# Patient Record
Sex: MALE | Race: WHITE | NOT HISPANIC OR LATINO | Employment: FULL TIME | URBAN - METROPOLITAN AREA
[De-identification: names, ages, dates, MRNs, and addresses within clinical notes are randomized per-mention and may not be internally consistent; named-entity substitution may affect disease eponyms.]

---

## 2022-01-12 ENCOUNTER — APPOINTMENT (OUTPATIENT)
Dept: RADIOLOGY | Age: 27
End: 2022-01-12
Payer: COMMERCIAL

## 2022-01-12 ENCOUNTER — APPOINTMENT (OUTPATIENT)
Dept: LAB | Age: 27
End: 2022-01-12
Payer: COMMERCIAL

## 2022-01-12 DIAGNOSIS — I10 ESSENTIAL HYPERTENSION, MALIGNANT: ICD-10-CM

## 2022-01-12 DIAGNOSIS — R60.1 GENERALIZED EDEMA: ICD-10-CM

## 2022-01-12 DIAGNOSIS — R63.8 WEIGHT DISORDER: ICD-10-CM

## 2022-01-12 LAB
ALBUMIN SERPL BCP-MCNC: 3.9 G/DL (ref 3.5–5)
ALP SERPL-CCNC: 49 U/L (ref 46–116)
ALT SERPL W P-5'-P-CCNC: 54 U/L (ref 12–78)
ANION GAP SERPL CALCULATED.3IONS-SCNC: 3 MMOL/L (ref 4–13)
AST SERPL W P-5'-P-CCNC: 30 U/L (ref 5–45)
BILIRUB SERPL-MCNC: 0.65 MG/DL (ref 0.2–1)
BUN SERPL-MCNC: 10 MG/DL (ref 5–25)
CALCIUM SERPL-MCNC: 9.3 MG/DL (ref 8.3–10.1)
CHLORIDE SERPL-SCNC: 107 MMOL/L (ref 100–108)
CHOLEST SERPL-MCNC: 128 MG/DL
CO2 SERPL-SCNC: 29 MMOL/L (ref 21–32)
CREAT SERPL-MCNC: 0.81 MG/DL (ref 0.6–1.3)
ERYTHROCYTE [DISTWIDTH] IN BLOOD BY AUTOMATED COUNT: 12.3 % (ref 11.6–15.1)
GFR SERPL CREATININE-BSD FRML MDRD: 120 ML/MIN/1.73SQ M
GLUCOSE P FAST SERPL-MCNC: 119 MG/DL (ref 65–99)
HCT VFR BLD AUTO: 43 % (ref 36.5–49.3)
HDLC SERPL-MCNC: 35 MG/DL
HGB BLD-MCNC: 15.2 G/DL (ref 12–17)
LDLC SERPL CALC-MCNC: 71 MG/DL (ref 0–100)
MCH RBC QN AUTO: 29.8 PG (ref 26.8–34.3)
MCHC RBC AUTO-ENTMCNC: 35.3 G/DL (ref 31.4–37.4)
MCV RBC AUTO: 84 FL (ref 82–98)
NONHDLC SERPL-MCNC: 93 MG/DL
PLATELET # BLD AUTO: 236 THOUSANDS/UL (ref 149–390)
PMV BLD AUTO: 10.9 FL (ref 8.9–12.7)
POTASSIUM SERPL-SCNC: 4 MMOL/L (ref 3.5–5.3)
PROT SERPL-MCNC: 7.4 G/DL (ref 6.4–8.2)
RBC # BLD AUTO: 5.1 MILLION/UL (ref 3.88–5.62)
SODIUM SERPL-SCNC: 139 MMOL/L (ref 136–145)
TRIGL SERPL-MCNC: 110 MG/DL
TSH SERPL DL<=0.05 MIU/L-ACNC: 2.27 UIU/ML (ref 0.36–3.74)
WBC # BLD AUTO: 5.04 THOUSAND/UL (ref 4.31–10.16)

## 2022-01-12 PROCEDURE — 85027 COMPLETE CBC AUTOMATED: CPT

## 2022-01-12 PROCEDURE — 80053 COMPREHEN METABOLIC PANEL: CPT

## 2022-01-12 PROCEDURE — 36415 COLL VENOUS BLD VENIPUNCTURE: CPT

## 2022-01-12 PROCEDURE — 80061 LIPID PANEL: CPT

## 2022-01-12 PROCEDURE — 84443 ASSAY THYROID STIM HORMONE: CPT

## 2022-01-28 ENCOUNTER — OFFICE VISIT (OUTPATIENT)
Dept: INTERNAL MEDICINE CLINIC | Facility: CLINIC | Age: 27
End: 2022-01-28

## 2022-01-28 VITALS
BODY MASS INDEX: 42.45 KG/M2 | SYSTOLIC BLOOD PRESSURE: 140 MMHG | HEART RATE: 88 BPM | TEMPERATURE: 98.3 F | DIASTOLIC BLOOD PRESSURE: 84 MMHG | HEIGHT: 72 IN | OXYGEN SATURATION: 98 % | WEIGHT: 313.4 LBS

## 2022-01-28 DIAGNOSIS — M32.9 LUPUS (HCC): ICD-10-CM

## 2022-01-28 DIAGNOSIS — L40.9 PSORIASIS: ICD-10-CM

## 2022-01-28 DIAGNOSIS — K76.0 HEPATIC STEATOSIS: ICD-10-CM

## 2022-01-28 DIAGNOSIS — J45.909 PERSISTENT ASTHMA WITHOUT COMPLICATION, UNSPECIFIED ASTHMA SEVERITY: Primary | ICD-10-CM

## 2022-01-28 DIAGNOSIS — R73.01 ELEVATED FASTING GLUCOSE: ICD-10-CM

## 2022-01-28 PROBLEM — F95.2 TOURETTE'S: Status: ACTIVE | Noted: 2022-01-28

## 2022-01-28 PROCEDURE — 99203 OFFICE O/P NEW LOW 30 MIN: CPT | Performed by: INTERNAL MEDICINE

## 2022-01-28 RX ORDER — BUDESONIDE AND FORMOTEROL FUMARATE DIHYDRATE 160; 4.5 UG/1; UG/1
2 AEROSOL RESPIRATORY (INHALATION) 2 TIMES DAILY
COMMUNITY
End: 2022-01-28 | Stop reason: ALTCHOICE

## 2022-01-28 RX ORDER — CETIRIZINE HYDROCHLORIDE 10 MG/1
CAPSULE, LIQUID FILLED ORAL
COMMUNITY
Start: 2021-03-16

## 2022-01-28 RX ORDER — ACETAMINOPHEN 500 MG
500 TABLET ORAL
COMMUNITY

## 2022-01-28 RX ORDER — ALBUTEROL SULFATE 90 UG/1
AEROSOL, METERED RESPIRATORY (INHALATION)
COMMUNITY

## 2022-01-28 RX ORDER — CLOTRIMAZOLE 1 %
CREAM (GRAM) TOPICAL 2 TIMES DAILY
COMMUNITY
Start: 2021-10-29 | End: 2022-01-28 | Stop reason: ALTCHOICE

## 2022-01-28 RX ORDER — BETAMETHASONE DIPROPIONATE 0.5 MG/G
CREAM TOPICAL
COMMUNITY
Start: 2019-07-03

## 2022-01-28 RX ORDER — FLUOCINONIDE 0.5 MG/G
OINTMENT TOPICAL 2 TIMES DAILY
COMMUNITY
End: 2022-01-28 | Stop reason: ALTCHOICE

## 2022-01-28 RX ORDER — EPINEPHRINE INHALATION 125 UG/1
AEROSOL RESPIRATORY (INHALATION)
COMMUNITY
Start: 2021-02-03 | End: 2022-01-28 | Stop reason: ALTCHOICE

## 2022-01-28 RX ORDER — CHLORAL HYDRATE 500 MG
CAPSULE ORAL
COMMUNITY
End: 2022-01-28 | Stop reason: ALTCHOICE

## 2022-01-28 RX ORDER — IBUPROFEN 600 MG/1
600 TABLET ORAL
COMMUNITY

## 2022-01-28 RX ORDER — COLLOIDAL OATMEAL 1 %
CREAM (GRAM) TOPICAL
COMMUNITY
Start: 2020-01-03

## 2022-01-28 RX ORDER — HYDROCORTISONE 10 MG/G
CREAM TOPICAL
COMMUNITY
Start: 2019-01-03

## 2022-01-28 RX ORDER — KETOCONAZOLE 20 MG/ML
SHAMPOO TOPICAL
COMMUNITY
Start: 2021-10-29

## 2022-01-28 RX ORDER — HYDROXYCHLOROQUINE SULFATE 200 MG/1
200 TABLET, FILM COATED ORAL 2 TIMES DAILY
COMMUNITY
Start: 2020-01-03

## 2022-01-28 NOTE — PROGRESS NOTES
[address] [facility]  INTERNAL MEDICINE OFFICE VISIT     PATIENT INFORMATION     Lalo Dimas   27 y o  male   MRN: [id number]    ASSESSMENT/PLAN     Diagnoses and all orders for this visit:    Persistent asthma without complication, unspecified asthma severity  Well controlled with combination LAMA/LABA/ICS inhaler  Uses albuterol once monthly  Functional status is good - can walk 30+ before becoming dyspneic  · Pt will call office with name and dose of his combination inhaler  · Pt said he has 5-6 samples of combination inhaler and didn't need new prescription at this time      Lupus ([address] )  Diagnosed in 2019/2020 per pt  No record of positive lab work in chart  Was following with [place] [facility] and currently on Plaquenil 200 mg BID  Requesting referral to rheumatology that is closer to his home  -     Ambulatory referral to Rheumatology; Future    Elevated fasting glucose  119 on CMP earlier this month  Family history of diabetes in his mother and grandmother  · Check a1c - no prior a1c in chart    -     Hemoglobin A1C; Future    Psoriasis  Plaques along scalp, elbows, periumbilical area, right thigh  Follows with dermatology and uses betamethasone 0 05% cream, Cortizone with aloe, and Eucerin  · Continue follow up with dermatology - pt prefers to continue with current dermatologist and declined new referral      Hepatic steatosis  RUQ US from 2017 showed fatty liver  Lipid panel from January 12, 2022 showed HDL 35, otherwise WNL  · Encouraged pt to continue exercise   · Continue following with weight management    Other orders  -     acetaminophen (TYLENOL) 500 mg tablet; Take 500 mg by mouth  -     albuterol (PROVENTIL HFA,VENTOLIN HFA) 90 mcg/act inhaler; Inhale  -     betamethasone dipropionate (DIPROSONE) 0 05 % cream  -     Discontinue: budesonide-formoterol (SYMBICORT) 160-4 5 mcg/act inhaler;  Inhale 2 puffs 2 (two) times a day (Patient not taking: Reported on 1/28/2022 )  -     Cetirizine HCl (ZyrTEC Allergy) 10 MG CAPS  -     Cholecalciferol 50 MCG (2000 UT) CAPS; Take 2,000 Units by mouth daily  -     Discontinue: clotrimazole (LOTRIMIN) 1 % cream; Apply topically 2 (two) times a day  -     Colloidal Oatmeal (Eucerin Eczema Relief) 1 % CREA  -     Discontinue: EPINEPHrine (Primatene Mist) 0 125 MG/ACT AERO;  (Patient not taking: Reported on 1/28/2022 )  -     Discontinue: fluocinonide (LIDEX) 0 05 % ointment; Apply topically 2 (two) times a day (Patient not taking: Reported on 1/28/2022 )  -     Fluticasone Propionate (FLONASE ALLERGY RELIEF NA)  -     hydrocortisone-aloe vera (Cortizone-10/Aloe) 1 %  -     hydroxychloroquine (PLAQUENIL) 200 mg tablet; Take 200 mg by mouth 2 (two) times a day   -     ibuprofen (MOTRIN) 600 mg tablet; Take 600 mg by mouth  -     Discontinue: ipratropium (ATROVENT HFA) 17 mcg/act inhaler; Inhale 2 puffs every 6 (six) hours (Patient not taking: Reported on 1/28/2022 )  -     ketoconazole (NIZORAL) 2 % shampoo; Apply topically  -     Multiple Vitamins-Minerals (ONE-A-DAY MENS HEALTH FORMULA PO)  -     Discontinue: Omega-3 Fatty Acids (fish oil) 1,000 mg; Take by mouth (Patient not taking: Reported on 1/28/2022 )      BMI Counseling: Body mass index is 42 5 kg/m²  The BMI is above normal  Exercise recommendations include moderate aerobic physical activity for 150 minutes/week  Pt declined vaccines, HIV and hepatitis C screening at this time and prefers to complete them later        Schedule a follow-up appointment in 6 months for annual physical     HEALTH MAINTENANCE     Immunization History   Administered Date(s) Administered    COVID-19 MODERNA VACC 0 5 ML IM 04/09/2021, 05/07/2021, 11/18/2021    Tdap 03/11/2010     CHIEF COMPLAINT     Chief Complaint   Patient presents with    New Patient Visit      HISTORY OF PRESENT ILLNESS     26 y/o male with history of lupus, asthma, psoriasis, hepatic steatosis, hepatomegaly, Tourette's here to establish care  Pt moved here a few months ago from the [place]  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   Pt states that his asthma is well controlled and only uses his rescue inhaler once every few months  He can walk 30+ minutes and jog 8 minutes before becoming short of breath  He was diagnosed when he was child  He was given samples of LAMA/LABA/ICS inhaler from his last PCP but cannot recall the brand  Triggers include cold weather and activity  Pt follows with dermatology for psoriasis and dandruff  Currently on betamethasone cream and Nizoral shampoo  Affected skin is along scalp, elbows, periumbilical area, right thigh  Pt states he was diagnosed with lupus in 2019 after a positive blood test   He follows with [place] [facility] and currently on Plaquenil  He is requesting referral to rheumatology  Denies smoking, IVDU, illict drug use  Drink 1 beer bottle every 3-4 days  Family history of diabetes in his mother and grandmother and RA in his grandmother  REVIEW OF SYSTEMS     Review of Systems   Constitutional: Negative for chills and fever  HENT: Negative for ear pain and sore throat  Eyes: Negative for pain and visual disturbance  Respiratory: Negative for cough and shortness of breath  Cardiovascular: Negative for chest pain and leg swelling  Gastrointestinal: Negative for abdominal pain, constipation, diarrhea and nausea  Genitourinary: Negative for difficulty urinating and dysuria  Musculoskeletal: Negative for arthralgias  Skin: Positive for rash  Neurological: Negative for dizziness       OBJECTIVE     Vitals:    01/28/22 0825   BP: 140/84   BP Location: Right arm   Patient Position: Sitting   Cuff Size: Large   Pulse: 88   Temp: 98 3 °F (36 8 °C)   TempSrc: Temporal   SpO2: 98%   Weight: (!) 142 kg (313 lb 6 4 oz)   Height: 6' (1 829 m)     Physical Exam  Constitutional:       General: He is not in acute distress  Appearance: He is obese  Eyes:      General: No scleral icterus  Cardiovascular:      Rate and Rhythm: Normal rate and regular rhythm  Pulses: Normal pulses  Heart sounds: Normal heart sounds  No murmur heard  Pulmonary:      Breath sounds: No wheezing, rhonchi or rales  Abdominal:      General: Bowel sounds are normal       Palpations: Abdomen is soft  Tenderness: There is no abdominal tenderness  Comments: Hepatomegaly   Musculoskeletal:      Right lower leg: No edema  Left lower leg: No edema  Skin:     General: Skin is warm and dry  Findings: Rash (Red scaly patches along anterior scalp and periumbilical area) present  Neurological:      General: No focal deficit present  Mental Status: He is alert     Psychiatric:         Behavior: Behavior normal        CURRENT MEDICATIONS     Current Outpatient Medications:     acetaminophen (TYLENOL) 500 mg tablet, Take 500 mg by mouth, Disp: , Rfl:     albuterol (PROVENTIL HFA,VENTOLIN HFA) 90 mcg/act inhaler, Inhale, Disp: , Rfl:     betamethasone dipropionate (DIPROSONE) 0 05 % cream, , Disp: , Rfl:     Cetirizine HCl (ZyrTEC Allergy) 10 MG CAPS, , Disp: , Rfl:     Cholecalciferol 50 MCG (2000 UT) CAPS, Take 2,000 Units by mouth daily, Disp: , Rfl:     clotrimazole (LOTRIMIN) 1 % cream, Apply topically 2 (two) times a day, Disp: , Rfl:     Colloidal Oatmeal (Eucerin Eczema Relief) 1 % CREA, , Disp: , Rfl:     Fluticasone Propionate (FLONASE ALLERGY RELIEF NA), , Disp: , Rfl:     hydrocortisone-aloe vera (Cortizone-10/Aloe) 1 %, , Disp: , Rfl:     hydroxychloroquine (PLAQUENIL) 200 mg tablet, , Disp: , Rfl:     ibuprofen (MOTRIN) 600 mg tablet, Take 600 mg by mouth, Disp: , Rfl:     ketoconazole (NIZORAL) 2 % shampoo, Apply topically, Disp: , Rfl:     Multiple Vitamins-Minerals (ONE-A-DAY MENS HEALTH FORMULA PO), , Disp: , Rfl:    budesonide-formoterol (SYMBICORT) 160-4 5 mcg/act inhaler, Inhale 2 puffs 2 (two) times a day (Patient not taking: Reported on 1/28/2022 ), Disp: , Rfl:     EPINEPHrine (Primatene Mist) 0 125 MG/ACT AERO, , Disp: , Rfl:     fluocinonide (LIDEX) 0 05 % ointment, Apply topically 2 (two) times a day (Patient not taking: Reported on 1/28/2022 ), Disp: , Rfl:     ipratropium (ATROVENT HFA) 17 mcg/act inhaler, Inhale 2 puffs every 6 (six) hours (Patient not taking: Reported on 1/28/2022 ), Disp: , Rfl:     Omega-3 Fatty Acids (fish oil) 1,000 mg, Take by mouth (Patient not taking: Reported on 1/28/2022 ), Disp: , Rfl:     PAST MEDICAL & SURGICAL HISTORY     Past Medical History:   Diagnosis Date    [address] [address] )      Past Surgical History:   Procedure Laterality Date    FOOT SURGERY       SOCIAL & FAMILY HISTORY     Social History     Socioeconomic History    Marital status: Single     Spouse name: Not on file    Number of children: Not on file    Years of education: Not on file    Highest education level: Not on file   Occupational History    Not on file   Tobacco Use    Smoking status: Never Smoker    Smokeless tobacco: Never Used   Vaping Use    Vaping Use: Never used   Substance and Sexual Activity    Alcohol use: Yes     Comment: about ecery 3 days has a drink    Drug use: Never    Sexual activity: Not on file   Other Topics Concern    Not on file   Social History Narrative    Not on file     Social Determinants of Health     Financial Resource Strain: Low Risk     Difficulty of Paying Living Expenses: Not hard at all   Food Insecurity: Not on file   Transportation Needs: No Transportation Needs    Lack of Transportation (Medical): No    Lack of Transportation (Non-Medical): No   Physical Activity: Not on file   Stress: No Stress Concern Present    Feeling of Stress :  Only a little   Social Connections: Not on file   Intimate Partner Violence: Not on file   Housing Stability: Not on file Social History     Substance and Sexual Activity   Alcohol Use Yes    Comment: about ecery 3 days has a drink     Social History     Substance and Sexual Activity   Drug Use Never     Social History     Tobacco Use   Smoking Status Never Smoker   Smokeless Tobacco Never Used     Family History   Problem Relation Age of Onset    Diabetes Mother     Depression Mother     Diabetes Maternal Grandmother          ==  Daniel Hardin DO  Internal Medicine Resident, PGY-1  [facility] [address] [facility] [address]  [address]   [facility] , [address] [place] [address] [address], [address] [facility]  Office: [phone]  Fax: [phone]

## 2022-04-14 ENCOUNTER — OFFICE VISIT (OUTPATIENT)
Dept: INTERNAL MEDICINE CLINIC | Facility: CLINIC | Age: 27
End: 2022-04-14

## 2022-04-14 VITALS
SYSTOLIC BLOOD PRESSURE: 138 MMHG | OXYGEN SATURATION: 96 % | HEART RATE: 108 BPM | BODY MASS INDEX: 41.37 KG/M2 | DIASTOLIC BLOOD PRESSURE: 89 MMHG | WEIGHT: 305 LBS | TEMPERATURE: 98.3 F

## 2022-04-14 DIAGNOSIS — R21 SKIN RASH: Primary | ICD-10-CM

## 2022-04-14 PROCEDURE — 99214 OFFICE O/P EST MOD 30 MIN: CPT | Performed by: INTERNAL MEDICINE

## 2022-04-14 RX ORDER — MULTIVIT-MIN/IRON/FOLIC ACID/K 18-600-40
CAPSULE ORAL
COMMUNITY
Start: 2022-04-12

## 2022-04-14 RX ORDER — FLUOCINONIDE TOPICAL SOLUTION USP, 0.05% 0.5 MG/ML
SOLUTION TOPICAL
COMMUNITY
Start: 2022-03-15

## 2022-04-14 RX ORDER — TURMERIC ROOT EXTRACT 500 MG
TABLET ORAL
COMMUNITY
Start: 2022-04-01

## 2022-04-14 RX ORDER — AMOXICILLIN 500 MG
CAPSULE ORAL
COMMUNITY
Start: 2022-04-01

## 2022-04-14 RX ORDER — PREDNISONE 20 MG/1
60 TABLET ORAL DAILY
Qty: 21 TABLET | Refills: 0 | Status: SHIPPED | OUTPATIENT
Start: 2022-04-14 | End: 2022-04-22

## 2022-04-14 NOTE — PROGRESS NOTES
[facility]  INTERNAL MEDICINE OFFICE VISIT     PATIENT INFORMATION     Marya Alexander   27 y o  male   MRN: [id number]    ASSESSMENT/PLAN       1  Skin rash:  Patient is presenting with diffuse skin rashes as can be seen on pictures under the media  Has lot of itching  It seems like patient has psoriasis and lupus rash which is chronic although now is presenting with new rash on his abdomen and bilateral thighs  Does not sound like flare-up of his autoimmune condition  No recent change in his life including no new clothes, laundry detergents, meds or food  The cause is unknown at this time although the rash almost sounds like inflammatory process  Will start patient on prednisone 60 mg daily for 7 days  Patient was asked to call clinic if he has any worsening or no improvement in his symptoms  Pictures available under media  Diagnoses and all orders for this visit:    Skin rash  -     predniSONE 20 mg tablet; Take 3 tablets (60 mg total) by mouth daily for 7 days    Other orders  -     Ascorbic Acid (Vitamin C) 500 MG CAPS  -     Omega-3 Fatty Acids (Fish Oil) 1200 MG CAPS  -     Turmeric 500 MG TABS  -     fluocinonide (LIDEX) 0 05 % external solution; APPLY TO SCALP BEFORE BED AND WASH OFF IN MORNING      Schedule a follow-up appointment in 1 week  HEALTH MAINTENANCE     Immunization History   Administered Date(s) Administered    COVID-19 MODERNA VACC 0 5 ML IM 04/09/2021, 05/07/2021, 11/18/2021    Tdap 03/11/2010     CHIEF COMPLAINT     Chief Complaint   Patient presents with    Blister     blisters, itching, redness, scabs over two weeks       HISTORY OF PRESENT ILLNESS     This is 73-year-old male with past medical history of lupus and psoriasis is presenting with diffuse skin rash on his abdomen and thigh  he admits that his rash started on his legs and then it moved to his abdomen  He does has history of lupus and psoriasis with the respective rash already present    He denies any changes in his detergent, clothing  He denies any other systemic symptoms including fever  No joint pain  His recent lupus labs are normal     REVIEW OF SYSTEMS     Review of Systems   Constitutional: Negative for activity change, appetite change, chills and diaphoresis  Respiratory: Negative for apnea, cough, chest tightness and shortness of breath  Cardiovascular: Negative for chest pain, palpitations and leg swelling  Gastrointestinal: Negative for abdominal distention, abdominal pain, constipation and diarrhea  Skin: Positive for color change and rash  OBJECTIVE     Vitals:    04/14/22 1155   BP: 138/89   BP Location: Left arm   Patient Position: Sitting   Cuff Size: Large   Pulse: (!) 108   Temp: 98 3 °F (36 8 °C)   TempSrc: Temporal   SpO2: 96%   Weight: (!) 138 kg (305 lb)     Physical Exam  Vitals reviewed  Constitutional:       General: He is not in acute distress  Appearance: He is well-developed  He is not diaphoretic  Cardiovascular:      Rate and Rhythm: Normal rate and regular rhythm  Heart sounds: Normal heart sounds  No murmur heard  No friction rub  Pulmonary:      Effort: Pulmonary effort is normal  No respiratory distress  Breath sounds: Normal breath sounds  No stridor  No wheezing  Abdominal:      General: Bowel sounds are normal  There is no distension  Palpations: Abdomen is soft  Tenderness: There is no abdominal tenderness  There is no guarding  Skin:     Findings: Erythema and rash present  Comments: Rash on abdomen and thighs  No discharge  Also has lupus and psorisis rash present which is chronic  Psychiatric:         Mood and Affect: Mood normal          Thought Content:  Thought content normal        CURRENT MEDICATIONS     Current Outpatient Medications:     acetaminophen (TYLENOL) 500 mg tablet, Take 500 mg by mouth, Disp: , Rfl:     albuterol (PROVENTIL HFA,VENTOLIN HFA) 90 mcg/act inhaler, Inhale, Disp: , Rfl:   Ascorbic Acid (Vitamin C) 500 MG CAPS, , Disp: , Rfl:     betamethasone dipropionate (DIPROSONE) 0 05 % cream, , Disp: , Rfl:     Cetirizine HCl (ZyrTEC Allergy) 10 MG CAPS, , Disp: , Rfl:     Cholecalciferol 50 MCG (2000 UT) CAPS, Take 2,000 Units by mouth daily, Disp: , Rfl:     Colloidal Oatmeal (Eucerin Eczema Relief) 1 % CREA, , Disp: , Rfl:     fluocinonide (LIDEX) 0 05 % external solution, APPLY TO SCALP BEFORE BED AND WASH OFF IN MORNING, Disp: , Rfl:     Fluticasone Propionate (FLONASE ALLERGY RELIEF NA), , Disp: , Rfl:     hydrocortisone-aloe vera (Cortizone-10/Aloe) 1 %, , Disp: , Rfl:     hydroxychloroquine (PLAQUENIL) 200 mg tablet, Take 200 mg by mouth 2 (two) times a day , Disp: , Rfl:     ibuprofen (MOTRIN) 600 mg tablet, Take 600 mg by mouth, Disp: , Rfl:     Multiple Vitamins-Minerals (ONE-A-DAY MENS HEALTH FORMULA PO), , Disp: , Rfl:     Omega-3 Fatty Acids (Fish Oil) 1200 MG CAPS, , Disp: , Rfl:     Turmeric 500 MG TABS, , Disp: , Rfl:     ketoconazole (NIZORAL) 2 % shampoo, Apply topically (Patient not taking: Reported on 4/14/2022 ), Disp: , Rfl:     predniSONE 20 mg tablet, Take 3 tablets (60 mg total) by mouth daily for 7 days, Disp: 21 tablet, Rfl: 0    PAST MEDICAL & SURGICAL HISTORY     Past Medical History:   Diagnosis Date    [address] ([facility] [address] )      Past Surgical History:   Procedure Laterality Date    FOOT SURGERY       SOCIAL & FAMILY HISTORY     Social History     Socioeconomic History    Marital status: Single     Spouse name: Not on file    Number of children: Not on file    Years of education: Not on file    Highest education level: Not on file   Occupational History    Not on file   Tobacco Use    Smoking status: Never Smoker    Smokeless tobacco: Never Used   Vaping Use    Vaping Use: Never used   Substance and Sexual Activity    Alcohol use: Yes     Comment: about ecery 3 days has a drink    Drug use: Never    Sexual activity: Not on file   Other Topics Concern    Not on file   Social History Narrative    Not on file     Social Determinants of Health     Financial Resource Strain: Low Risk     Difficulty of Paying Living Expenses: Not hard at all   Food Insecurity: Not on file   Transportation Needs: No Transportation Needs    Lack of Transportation (Medical): No    Lack of Transportation (Non-Medical): No   Physical Activity: Not on file   Stress: No Stress Concern Present    Feeling of Stress : Only a little   Social Connections: Not on file   Intimate Partner Violence: Not on file   Housing Stability: Not on file     Social History     Substance and Sexual Activity   Alcohol Use Yes    Comment: about ecery 3 days has a drink     Social History     Substance and Sexual Activity   Drug Use Never     Social History     Tobacco Use   Smoking Status Never Smoker   Smokeless Tobacco Never Used     Family History   Problem Relation Age of Onset    Diabetes Mother     Depression Mother     Diabetes Maternal Grandmother      ==  Viviane Floor, DO  PGY-3  [facility]  [address] [facility] , [address] [address] [facility] [address], [address] [facility]  Office: [phone]  Fax: [phone]

## 2022-04-22 ENCOUNTER — OFFICE VISIT (OUTPATIENT)
Dept: INTERNAL MEDICINE CLINIC | Facility: CLINIC | Age: 27
End: 2022-04-22

## 2022-04-22 VITALS
OXYGEN SATURATION: 98 % | SYSTOLIC BLOOD PRESSURE: 145 MMHG | HEART RATE: 98 BPM | BODY MASS INDEX: 42.64 KG/M2 | DIASTOLIC BLOOD PRESSURE: 93 MMHG | HEIGHT: 71 IN | WEIGHT: 304.6 LBS | TEMPERATURE: 98.1 F

## 2022-04-22 DIAGNOSIS — R21 SKIN RASH: Primary | ICD-10-CM

## 2022-04-22 PROCEDURE — 3008F BODY MASS INDEX DOCD: CPT | Performed by: INTERNAL MEDICINE

## 2022-04-22 PROCEDURE — 99213 OFFICE O/P EST LOW 20 MIN: CPT | Performed by: INTERNAL MEDICINE

## 2022-04-22 PROCEDURE — 1036F TOBACCO NON-USER: CPT | Performed by: INTERNAL MEDICINE

## 2022-04-22 NOTE — PATIENT INSTRUCTIONS
Continue using the betamethasone cream as needed to relieve any residual itching  If this rash recurs, please call our office to be re-evaluated

## 2022-04-22 NOTE — PROGRESS NOTES
[facility]  INTERNAL MEDICINE OFFICE VISIT     PATIENT INFORMATION     Maite Avalos   27 y o  male   MRN: [id number]    ASSESSMENT/PLAN     Diagnoses and all orders for this visit:    Skin rash  - Instructed patient to continue using betamethasone cream for symptomatic relief  - Instructed patient to call our office if this rash recurs    Schedule a follow-up appointment in 6 months for follow up of chronic conditions  HEALTH MAINTENANCE     Immunization History   Administered Date(s) Administered    COVID-19 MODERNA VACC 0 5 ML IM 04/09/2021, 05/07/2021, 11/18/2021    Tdap 03/11/2010     CHIEF COMPLAINT     Chief Complaint   Patient presents with    Follow-up     medication for rash all over body       HISTORY OF PRESENT ILLNESS     26M with history of lupus and psoriasis presents as follow up after being seen for a new rash last week  It was determined to likely be atopic dermatitis and the patient was given 60mg prednisone for 7 days  Patient states that the generalized rash has improved significantly - he has noticed less redness and itching after completing the prednisone  He still has his psoriasis rashes that are chronic  He has been using the diprosone cream along with the prednisone which has significantly improved his rash  Without the prednisone, he noticed that the itching would be subdued for a few hours but the rash itself would not decrease until he started the prednisone  Since the prednisone course was completed, he still uses the betamethasone cream which helps the itching  No other new rashes since starting the prednisone  He denies dyspnea, nausea/vomiting, constipation, diarrhea, abdominal pain      REVIEW OF SYSTEMS     Review of Systems - see HPI    OBJECTIVE     Vitals:    04/22/22 1137   BP: 145/93   BP Location: Left arm   Patient Position: Sitting   Cuff Size: Standard   Pulse: 98   Temp: 98 1 °F (36 7 °C)   TempSrc: Temporal   SpO2: 98% Weight: (!) 138 kg (304 lb 9 6 oz)   Height: 5' 11" (1 803 m)     Physical Exam  Vitals reviewed  Constitutional:       General: He is awake  He is not in acute distress  Appearance: Normal appearance  He is not ill-appearing  HENT:      Head: Normocephalic and atraumatic  Skin:     Comments: Several areas of minor erythema noted on the bilateral lower extremities and abdomen, significantly improved from prior   Neurological:      Mental Status: He is alert and oriented to person, place, and time     Psychiatric:         Mood and Affect: Mood normal          Behavior: Behavior normal        CURRENT MEDICATIONS     Current Outpatient Medications:     acetaminophen (TYLENOL) 500 mg tablet, Take 500 mg by mouth, Disp: , Rfl:     albuterol (PROVENTIL HFA,VENTOLIN HFA) 90 mcg/act inhaler, Inhale, Disp: , Rfl:     Ascorbic Acid (Vitamin C) 500 MG CAPS, , Disp: , Rfl:     betamethasone dipropionate (DIPROSONE) 0 05 % cream, , Disp: , Rfl:     Cetirizine HCl (ZyrTEC Allergy) 10 MG CAPS, , Disp: , Rfl:     Colloidal Oatmeal (Eucerin Eczema Relief) 1 % CREA, , Disp: , Rfl:     fluocinonide (LIDEX) 0 05 % external solution, APPLY TO SCALP BEFORE BED AND WASH OFF IN MORNING, Disp: , Rfl:     Fluticasone Propionate (FLONASE ALLERGY RELIEF NA), , Disp: , Rfl:     hydroxychloroquine (PLAQUENIL) 200 mg tablet, Take 200 mg by mouth 2 (two) times a day , Disp: , Rfl:     Omega-3 Fatty Acids (Fish Oil) 1200 MG CAPS, , Disp: , Rfl:     predniSONE 20 mg tablet, Take 3 tablets (60 mg total) by mouth daily for 7 days, Disp: 21 tablet, Rfl: 0    Turmeric 500 MG TABS, , Disp: , Rfl:     Cholecalciferol 50 MCG (2000 UT) CAPS, Take 2,000 Units by mouth daily, Disp: , Rfl:     hydrocortisone-aloe vera (Cortizone-10/Aloe) 1 %, , Disp: , Rfl:     ibuprofen (MOTRIN) 600 mg tablet, Take 600 mg by mouth (Patient not taking: Reported on 4/22/2022 ), Disp: , Rfl:     ketoconazole (NIZORAL) 2 % shampoo, Apply topically (Patient not taking: Reported on 4/14/2022 ), Disp: , Rfl:     Multiple Vitamins-Minerals (ONE-A-DAY MENS HEALTH FORMULA PO), , Disp: , Rfl:     PAST MEDICAL & SURGICAL HISTORY     Past Medical History:   Diagnosis Date    Lupus ([address] )      Past Surgical History:   Procedure Laterality Date    FOOT SURGERY       SOCIAL & FAMILY HISTORY     Social History     Socioeconomic History    Marital status: Single     Spouse name: Not on file    Number of children: Not on file    Years of education: Not on file    Highest education level: Not on file   Occupational History    Not on file   Tobacco Use    Smoking status: Never Smoker    Smokeless tobacco: Never Used   Vaping Use    Vaping Use: Never used   Substance and Sexual Activity    Alcohol use: Yes     Comment: about ecery 3 days has a drink    Drug use: Never    Sexual activity: Not on file   Other Topics Concern    Not on file   Social History Narrative    Not on file     Social Determinants of Health     Financial Resource Strain: Low Risk     Difficulty of Paying Living Expenses: Not hard at all   Food Insecurity: No Food Insecurity    Worried About [address] in the Last Year: Never true    [address] in the Last Year: Never true   Transportation Needs: No Transportation Needs    Lack of Transportation (Medical): No    Lack of Transportation (Non-Medical): No   Physical Activity: Not on file   Stress: No Stress Concern Present    Feeling of Stress :  Only a little   Social Connections: Not on file   Intimate Partner Violence: Not on file   Housing Stability: [address] Unable to Pay for Housing in the Last Year: No    Number of [place] in the Last Year: 2    Unstable Housing in the Last Year: No     Social History     Substance and Sexual Activity   Alcohol Use Yes    Comment: about ecery 3 days has a drink       Social History     Substance and Sexual Activity   Drug Use Never     Social History     Tobacco Use Smoking Status Never Smoker   Smokeless Tobacco Never Used     Family History   Problem Relation Age of Onset    Diabetes Mother     Depression Mother     Diabetes Maternal Grandmother                ==  Ruthie Bumpers,   PGY-1  [facility] [address] [facility] , [address] [address] [facility] [address], [address] [facility]  Office: [phone]  Fax: [phone]

## 2022-05-19 ENCOUNTER — TELEPHONE (OUTPATIENT)
Dept: DERMATOLOGY | Facility: CLINIC | Age: 27
End: 2022-05-19

## 2022-05-19 NOTE — TELEPHONE ENCOUNTER
Pt calling to schedule 3 wk f/u for allergic reaction  Returned call to pt that he wasn't seen in our practice and provided # that was listed on the documentation 4/5 for [facility] area

## 2022-06-06 ENCOUNTER — TELEPHONE (OUTPATIENT)
Dept: INTERNAL MEDICINE CLINIC | Facility: CLINIC | Age: 27
End: 2022-06-06

## 2022-06-06 NOTE — TELEPHONE ENCOUNTER
LMOM TO R/S MISSED APPT- PT APPEARS TO HAVE ESTABLISHED WITH [facility]  PLEASE CONFIRM IF THIS IS CORRECT

## 2022-10-12 ENCOUNTER — TELEPHONE (OUTPATIENT)
Dept: INTERNAL MEDICINE CLINIC | Facility: CLINIC | Age: 27
End: 2022-10-12

## 2022-10-12 NOTE — TELEPHONE ENCOUNTER
Upon confirming patients appt he has notified me that he no longer will establish care with us  He has established care else where  Please remove Dr Grajeda [facility] as PCP, thank you